# Patient Record
Sex: FEMALE | Race: BLACK OR AFRICAN AMERICAN | NOT HISPANIC OR LATINO | Employment: PART TIME | URBAN - METROPOLITAN AREA
[De-identification: names, ages, dates, MRNs, and addresses within clinical notes are randomized per-mention and may not be internally consistent; named-entity substitution may affect disease eponyms.]

---

## 2023-05-14 ENCOUNTER — HOSPITAL ENCOUNTER (EMERGENCY)
Facility: CLINIC | Age: 23
Discharge: HOME | End: 2023-05-14
Attending: EMERGENCY MEDICINE | Admitting: EMERGENCY MEDICINE
Payer: COMMERCIAL

## 2023-05-14 VITALS
BODY MASS INDEX: 40.18 KG/M2 | SYSTOLIC BLOOD PRESSURE: 137 MMHG | WEIGHT: 250 LBS | HEART RATE: 81 BPM | RESPIRATION RATE: 18 BRPM | OXYGEN SATURATION: 100 % | DIASTOLIC BLOOD PRESSURE: 86 MMHG | TEMPERATURE: 98 F | HEIGHT: 66 IN

## 2023-05-14 DIAGNOSIS — F32.A DEPRESSION, UNSPECIFIED DEPRESSION TYPE: ICD-10-CM

## 2023-05-14 PROCEDURE — 99285 EMERGENCY DEPT VISIT HI MDM: CPT | Mod: 25

## 2023-05-14 PROCEDURE — 90791 PSYCH DIAGNOSTIC EVALUATION: CPT

## 2023-05-14 RX ORDER — HYDROXYZINE HYDROCHLORIDE 25 MG/1
25 TABLET, FILM COATED ORAL 3 TIMES DAILY PRN
Qty: 9 TABLET | Refills: 0 | Status: SHIPPED | OUTPATIENT
Start: 2023-05-14

## 2023-05-14 RX ORDER — HYDROXYZINE HYDROCHLORIDE 25 MG/1
25 TABLET, FILM COATED ORAL 3 TIMES DAILY PRN
Qty: 9 TABLET | Refills: 0 | Status: SHIPPED | OUTPATIENT
Start: 2023-05-14 | End: 2023-05-14

## 2023-05-14 ASSESSMENT — COLUMBIA-SUICIDE SEVERITY RATING SCALE - C-SSRS
TOTAL  NUMBER OF ACTUAL ATTEMPTS LIFETIME: 1
LETHALITY/MEDICAL DAMAGE CODE MOST LETHAL ACTUAL ATTEMPT: NO PHYSICAL DAMAGE OR VERY MINOR PHYSICAL DAMAGE
MOST LETHAL DATE: 66606
1. IN THE PAST MONTH, HAVE YOU WISHED YOU WERE DEAD OR WISHED YOU COULD GO TO SLEEP AND NOT WAKE UP?: YES
LETHALITY/MEDICAL DAMAGE CODE MOST LETHAL POTENTIAL ATTEMPT: BEHAVIOR NOT LIKELY TO RESULT IN INJURY
LETHALITY/MEDICAL DAMAGE CODE MOST RECENT POTENTIAL ATTEMPT: BEHAVIOR NOT LIKELY TO RESULT IN INJURY
REASONS FOR IDEATION LIFETIME: MOSTLY TO END OR STOP THE PAIN (YOU COULDN'T GO ON LIVING WITH THE PAIN OR HOW YOU WERE FEELING)
TOTAL  NUMBER OF ACTUAL ATTEMPTS PAST 3 MONTHS: 1
LETHALITY/MEDICAL DAMAGE CODE FIRST POTENTIAL ATTEMPT: BEHAVIOR NOT LIKELY TO RESULT IN INJURY
2. HAVE YOU ACTUALLY HAD ANY THOUGHTS OF KILLING YOURSELF?: YES
MOST RECENT DATE: 66606
LETHALITY/MEDICAL DAMAGE CODE MOST RECENT ACTUAL ATTEMPT: NO PHYSICAL DAMAGE OR VERY MINOR PHYSICAL DAMAGE
TOTAL  NUMBER OF INTERRUPTED ATTEMPTS LIFETIME: NO
LETHALITY/MEDICAL DAMAGE CODE FIRST ACTUAL ATTEMPT: NO PHYSICAL DAMAGE OR VERY MINOR PHYSICAL DAMAGE
FIRST ATTEMPT DATE: 66606
1. HAVE YOU WISHED YOU WERE DEAD OR WISHED YOU COULD GO TO SLEEP AND NOT WAKE UP?: YES
4. HAVE YOU HAD THESE THOUGHTS AND HAD SOME INTENTION OF ACTING ON THEM?: YES
5. HAVE YOU STARTED TO WORK OUT OR WORKED OUT THE DETAILS OF HOW TO KILL YOURSELF? DO YOU INTEND TO CARRY OUT THIS PLAN?: YES
ATTEMPT PAST THREE MONTHS: YES
REASONS FOR IDEATION PAST MONTH: MOSTLY TO END OR STOP THE PAIN (YOU COULDN'T GO ON LIVING WITH THE PAIN OR HOW YOU WERE FEELING)
2. HAVE YOU ACTUALLY HAD ANY THOUGHTS OF KILLING YOURSELF?: YES
TOTAL  NUMBER OF ABORTED OR SELF INTERRUPTED ATTEMPTS LIFETIME: NO
ATTEMPT LIFETIME: YES
5. HAVE YOU STARTED TO WORK OUT OR WORKED OUT THE DETAILS OF HOW TO KILL YOURSELF? DO YOU INTEND TO CARRY OUT THIS PLAN?: YES
3. HAVE YOU BEEN THINKING ABOUT HOW YOU MIGHT KILL YOURSELF?: YES
6. HAVE YOU EVER DONE ANYTHING, STARTED TO DO ANYTHING, OR PREPARED TO DO ANYTHING TO END YOUR LIFE?: NO
4. HAVE YOU HAD THESE THOUGHTS AND HAD SOME INTENTION OF ACTING ON THEM?: YES

## 2023-05-14 ASSESSMENT — ACTIVITIES OF DAILY LIVING (ADL): ADLS_ACUITY_SCORE: 35

## 2023-05-14 NOTE — ED TRIAGE NOTES
"Pt presents to the ED with c/o worsening depression and suicidal ideation. Pt had a plan to hang herself on Friday with a cable but it \"took too long\". Pt has been feeling more sad and down for the past couple months and has been isolating herself and not attending class. Pt here with mother.       "

## 2023-05-14 NOTE — ED PROVIDER NOTES
EMERGENCY DEPARTMENT ENCOUNTER      NAME: Abril Jang  AGE: 23 year old female  YOB: 2000  MRN: [id number]  EVALUATION DATE & TIME: 5/14/2023 11:36 AM    PCP: No primary care provider on file.    ED PROVIDER: Nya Castro MD      Chief Complaint   Patient presents with     Suicidal         FINAL IMPRESSION:  1. Depression, unspecified depression type          ED COURSE & MEDICAL DECISION MAKING:    Pertinent Labs & Imaging studies reviewed. (See chart for details)  23 year old female presents to the Emergency Department for evaluation of depression and suicidal ideation.  Patient reports that she has been depressed since she was in fifth grade, she has never discussed this with her family.  Since January, she has been feeling more withdrawn, she stopped going to her college courses.  She was placed on academic suspension, her family was not aware of this.  They showed up for her graduation this weekend, she did not graduate as she has not finished her courses.  Patient has never been seen for depression.  She has never been on antidepressants.  Today, she states that she does not feel she would act on any suicidal ideation.  DEC evaluated the patient and was able to have the patient contract for safety.  She is now home with her mom.  DEC was able to schedule follow-up appointments for the patient, patient was given hydroxyzine for anxiety in the interim.  The patient and the patient's mother comfortable with the patient discharging home with the mom.    12:59 PM FAWAD Glover. Appointment on Tuesday. Anxious. Recommend some hydroxyzine.   1:14 PM Rechecked and updated the patient. We discussed the plan for discharge and the patient is agreeable. Reviewed supportive cares, symptomatic treatment, outpatient follow up, and reasons to return to the Emergency Department. Patient to be discharged by ED RN.     At the conclusion of the encounter I discussed the results of all of the tests and the  disposition. The questions were answered. The patient or family acknowledged understanding and was agreeable with the care plan.       Medical Decision Making    History:    Supplemental history from: Documented in chart, if applicable    External Record(s) reviewed: Other: Nurse Triage Phone Call through  5/14    Work Up:    Chart documentation includes differential considered and any EKGs or imaging independently interpreted by provider, where specified.    In additional to work up documented, I considered the following work up: Documented in chart, if applicable.    External consultation:    Discussion of management with another provider: Documented in chart, if applicable    Complicating factors:    Care impacted by chronic illness: Other: depression    Care affected by social determinants of health: Access to Medical Care: no primary care provider    Disposition considerations: Discharge. I prescribed additional prescription strength medication(s) as charted. See documentation for any additional details.      MEDICATIONS GIVEN IN THE EMERGENCY:  Medications - No data to display    NEW PRESCRIPTIONS STARTED AT TODAY'S ER VISIT  New Prescriptions    HYDROXYZINE (ATARAX) 25 MG TABLET    Take 1 tablet (25 mg) by mouth 3 times daily as needed for itching          =================================================================    HPI    Patient information was obtained from: Patient    Use of : N/A         Abril Jang is a 23 year old female with who presents to this ED for evaluation of suicidal ideation and depression.  The patient feels that she has been depressed since she was in fifth grade, she has never seen a provider for this.  She has never been on antidepressants.  Her symptoms worsened in January.  Since then she has been skipping classes and not going to school.  Her family was unaware of this.  She is currently on academic suspension.  She was supposed to graduate this year but is not  "going to graduate.  She was at college until she came back home on Saturday.  She is now living with her mom.  She states that on Friday she put a cable around her neck and attempted to kill herself, but it was \"taking too long\", she therefore aborted the plan.  She states that she does not believe today that she would hurt herself.  She is looking for help with her depression and suicidal ideation.  She states that she uses marijuana regularly and has for the last 3 years.  She drinks alcohol very occasionally, otherwise no other recreational drug use.  She denies taking any other medications.      PAST MEDICAL HISTORY:  History reviewed. No pertinent past medical history.    PAST SURGICAL HISTORY:  History reviewed. No pertinent surgical history.        CURRENT MEDICATIONS:    hydrOXYzine (ATARAX) 25 MG tablet        ALLERGIES:  No Known Allergies    FAMILY HISTORY:  History reviewed. No pertinent family history.    SOCIAL HISTORY:   Social History     Socioeconomic History     Marital status: Single       VITALS:  BP (!) 159/100   Pulse 89   Temp 98  F (36.7  C) (Temporal)   Resp 18   Ht 1.676 m (5' 6\")   Wt 113.4 kg (250 lb)   SpO2 99%   BMI 40.35 kg/m      PHYSICAL EXAM    Constitutional: Well developed, Well nourished, NAD  HENT: Normocephalic, Atraumatic, Bilateral external ears normal, Oropharynx normal, mucous membranes moist, Nose normal.   Neck- Normal range of motion, No tenderness, Supple, No stridor.  Eyes: PERRL, EOMI, Conjunctiva normal, No discharge.   Respiratory: Normal breath sounds, No respiratory distress  Cardiovascular: Normal heart rate, Regular rhythm  Musculoskeletal: No edema. Good range of motion in all major joints. No tenderness to palpation or major deformities noted.   Integument: Warm, Dry, No erythema, No rash  Neurologic: Alert & oriented x 3, Normal motor function, Normal sensory function, No focal deficits noted. Normal gait.   Psychiatric: Flat affect, Judgment normal, " Mood normal.      LAB:  All pertinent labs reviewed and interpreted.       RADIOLOGY:  Reviewed all pertinent imaging. Please see official radiology report.  No orders to display       I, Bianka Cisneros, am serving as a scribe to document services personally performed by Dr. Gloria MD, based on my observations and the provider's statements to me. I, Dr. Gloria MD attest that Bianka Cisneros is acting in a scribe capacity, has observed my performance of the services and has documented them in accordance with my direction.    Nya Castro MD  Emergency Medicine  [facility] EMERGENCY ROOM  [address] [id number] [phone]       Nya Castro MD  05/14/23 [id number]

## 2023-05-14 NOTE — DISCHARGE INSTRUCTIONS
"      Aftercare Plan  If I am feeling unsafe or I am in a crisis, I will:   Contact my established care providers   Call the National Suicide Prevention Lifeline: 988  Go to the nearest emergency room   Call 911   [facility] Crisis Line Number: [phone]      Today you were seen by a licensed mental health professional through Triage and Transition services, [facility] ([facility])  for a crisis assessment in the Emergency Department at [facility].  It is recommended that you follow up with your established providers (psychiatrist, mental health therapist, and/or primary care doctor - as relevant) as soon as possible.     Coordinators from [facility] will be calling you in the next 24-48 hours to ensure that you have the resources you need.  You can also contact [facility] coordinators directly at [phone]. You may have been scheduled for or offered an appointment with a mental health provider. [facility] maintains an extensive network of licensed behavioral health providers to connect patients with the services they need.  We do not charge providers a fee to participate in our referral network.  We match patients with providers based on a patient's specific needs, insurance coverage, and location.  Our first effort will be to refer you to a provider within your care system, and will utilize providers outside your care system as needed.        Warning signs that I or other people might notice when a crisis is developing for me: Thinking about harming self;  Having difficulty sleeping, sleeping too much, unable to meet daily tasks    Things I am able to do on my own to cope or help me feel better: Check out \"brief, free mindfulness meditations on YouTube; check out web for \"ways to manage anxiety\" or \"ways to manage depression.\"  Get outside.  Call someone I trust     Things that I am able to do with others to cope or help me better: Do the things I enjoy with people I enjoy being with; work with a good " "therapist on ways to manage anxiety and depression; get a medication manager for medications      Changes I can make to support my mental health and wellness: start talking with trusted others about thoughts and feelings; let others know what is not helping what is helping; Contact [facility] above for a therapist and a medication manager; talk with [facility] staff about struggles with emotional and physical health at my appointment on Tuesday;  Practice good self care and supportive self talke      Your [facility] has a mental health crisis team you can call 24/7: [facility] Crisis Line Number: [phone]        Crisis Lines  Crisis Text Line  Text 717038  You will be connected with a trained live crisis counselor to provide support.    Por espanol, texto  EVIN a 798819 o texto a 442-AYUDAME en WhatsApp    [employer] (LGBTQ Youth Crisis Line)  1.406.085.6860  text START to 962-521      Community iRule  Fast Tracker  Linking people to mental health and substance use disorder resources  [employer]     [place] Mental [facility] Warm Line  Peer to peer support  Monday thru Saturday, 12 pm to 10 pm  223.076.8659 or 6.239.524.9987  Text \"Support\" to 20281    National [place] [employer] Mental Illness (DANIEL)  734.362.4122 or 1.888.DANIEL.HELPS      Mental Health Apps  My3  https://my3app.org/    VirtualHopeBox  [email or web address]/virtual-hope-box/                           "

## 2023-05-14 NOTE — CONSULTS
"Diagnostic Evaluation Consultation  Crisis Assessment    Patient was assessed: Katherine  Patient location: [facility] ED  Was a release of information signed: No. Reason: no current providers      Referral Data and Chief Complaint  Abril Jang is a 23 year old, who uses she/her pronouns, and presents to the ED with family/friends. Patient is referred to the ED by self. Patient is presenting to the ED for the following concerns: recent suicide attempt, MDD.      Informed Consent and Assessment Methods     Patient is her own guardian. Writer met with patient and explained the crisis assessment process, including applicable information disclosures and limits to confidentiality, assessed understanding of the process, and obtained consent to proceed with the assessment. Patient was observed to be able to participate in the assessment as evidenced by voluntarily engaged in assessment . Assessment methods included conducting a formal interview with patient, review of medical records, collaboration with medical staff, and obtaining relevant collateral information from family and community providers when available..     Over the course of this crisis assessment provided reassurance, offered validation and assisted in processing patient's thoughts and feeling relating to telling someone about MDD and SI . Patient's response to interventions was cooperative, engaged.  Patient was somewhat irritated that writer did not have entire story of what happened on Friday.  Patient explained as noted below.      Summary of Patient Situation    Patient called [facility]riage [facility] and told them she tried to hang self on Friday but that it \"took too long.\"  ED asked patient to come to ED immediately for an assessment.  Patient has a clinic appointment at [facility] on Tuesday.  Patient arrives to ED with mother.     On Friday after trying to hang herself, Pt waited about 20 minutes then took cable down and started doing dishes.  Patient states this " was first time she tried to end her life but states she has had depression since the 5th grade and has thought about suicide many times.  This weekend, mother and other family were expecting to go to patient's college graduation.  Mother says patient has been telling mother that school is going well and was on track for graduation.   Patient has not been attending classes and is on academic probation.   After the attempt to end her life on Friday, patient told a friend Saturday.  She then told her mother about the attempt as well as the extent of her depression.  Patient says she has not been sleeping well.  She has not been able to get out of bed.  She has not been attending classes.      Pt states she is not currently actively suicidal.  She says she can and will call a crisis line.  She will be staying with her mother.          Brief Psychosocial History    Patient was in college until this weekend when family discovered patient has not been attending classes and is not graduating.  Patient was studying economics and business administration.  She states she is not sure what she was going to do with the degree.  Patient is now living with her mother in [facility].  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       Patient has older brother 27with whom she is closest of all her family. Patient's father lives in [place].  They do not have a close relationship but do talk by phone.   Patient does not have a lot of current interests or hobbies.  She says the depression has been keeping her from doing the things she enjoys.    Patient says both parents likely have depression and anxiety.      Significant Clinical History    Patient states Friday was first time she tried to end life.  Patient states she has had MDD since 5th grade.   She has never seen a therapist or been on medication.  Patient says she can call a crisis line but it is difficult to  talk with family.  Patient denies overt trauma.  She has never been hospitalized.  Mother worries patient has BED.  Mother says patient uses food to comfort herself.       Collateral Information    The following information was received from mother Beth Diallo ,  Information was obtained via phone.they last had contact with patient today in ED.     What happened today:  Family had come to patient's graduation to find that patient is not graduating.  Patient told mother of attempt to hang self on Friday.       What is different about patient's functioning: mother says patient gave no indication that she had depression to extent patient now reports.  About 1 year ago patient told mother patient was depressed and was going to talk with someone.  Mother said patient was obviously untruthful about having gotten help then    Concern about alcohol/drug use: No    What do you think the patient needs: Some one to talk with, medications.  Check in to possible eating dx.     Has patient made comments about wanting to kill themselves/others:   Patient made comment about depression 1 year ago and told mother of attempt made Friday.    If d/c is recommended, can they take part in safety/aftercare planning: Yes Pt will be living with mother       Risk Assessment    [place] Suicide Severity Rating Scale Full Clinical Version: 05/14/2023  Suicidal Ideation  1. Wish to be Dead (Lifetime): Yes  1. Wish to be Dead (Past 1 Month): Yes  2. Non-Specific Active Suicidal Thoughts (Lifetime): Yes  2. Non-Specific Active Suicidal Thoughts (Past 1 Month): Yes  3. Active Suicidal Ideation with any Methods (Not Plan) Without Intent to Act (Lifetime): Yes  3. Active Suicidal Ideation with any Methods (Not Plan) Without Intent to Act (Past 1 Month): Yes  4. Active Suicidal Ideation with Some Intent to Act, Without Specific Plan (Lifetime): Yes  4. Active Suicidal Ideation with Some Intent to Act, Without Specific Plan (Past 1 Month):  Yes  5. Active Suicidal Ideation with Specific Plan and Intent (Lifetime): Yes  5. Active Suicidal Ideation with Specific Plan and Intent (Past 1 Month): Yes  Intensity of Ideation  Most Severe Ideation Rating (Lifetime): 5  Most Severe Ideation Rating (Past 1 Month): 5  Frequency (Lifetime): Once a week  Frequency (Past 1 Month): 2-5 times in week  Duration (Lifetime): Less than 1 hour/some of the time  Duration (Past 1 Month): 1-4 hours/a lot of time  Controllability (Lifetime): Can control thoughts with a lot of difficulty  Controllability (Past 1 Month): Unable to control thoughts  Deterrents (Lifetime): Deterrents probably stopped you  Deterrents (Past 1 Month): Deterrents most likely did not stop you  Reasons for Ideation (Lifetime): Mostly to end or stop the pain (You couldn't go on living with the pain or how you were feeling)  Reasons for Ideation (Past 1 Month): Mostly to end or stop the pain (You couldn't go on living with the pain or how you were feeling)  Suicidal Behavior  Actual Attempt (Lifetime): Yes  Total Number of Actual Attempts (Lifetime): 1  Actual Attempt (Past 3 Months): Yes  Total Number of Actual Attempts (Past 3 Months): 1  Has subject engaged in non-suicidal self-injurious behavior? (Lifetime): No  Interrupted Attempts (Lifetime): No  Aborted or Self-Interrupted Attempt (Lifetime): No  Preparatory Acts or Behavior (Lifetime): No  C-SSRS Risk (Lifetime/Recent)  Calculated C-SSRS Risk Score (Lifetime/Recent): High Risk    [place] Suicide Severity Rating Scale Since Last Contact:      Actual/Potential Lethality (Most Lethal Attempt)  Most Lethal Attempt Date: 05/12/23  Actual Lethality/Medical Damage Code (Most Lethal Attempt): No physical damage or very minor physical damage  Potential Lethality Code (Most Lethal Attempt): Behavior not likely to result in injury       Validity of evaluation is not impacted by presenting factors during interview .   Comments regarding subjective versus  objective responses to [place] tool:   Environmental or Psychosocial Events: work or task failure, challenging interpersonal relationships, geographic isolation from supports, history of TBI, neither working nor attending school and social isolation  Chronic Risk Factors: history of suicide attempts (1), chronic and ongoing sleep difficulties and parental mental health issue   Warning Signs: seeking access to means to hurt or kill self, talking or writing about death, dying, or suicide, hopelessness, feeling trapped, like there is no way out, withdrawing from friends, family, and society, anxiety, agitation, unable to sleep, sleeping all the time, dramatic changes in mood, no reason for living, no sense of purpose in life and engaging in self-destructive behavior  Protective Factors: reality testing ability  Interpretation of Risk Scoring, Risk Mitigation Interventions and Safety Plan:       Does the patient have thoughts of harming others? No     Is the patient engaging in sexually inappropriate behavior?  no        Current Substance Abuse     Is there recent substance abuse? Patient uses marijuana regularly but states it is not problematic     Was a urine drug screen or blood alcohol level obtained: No       Mental Status Exam     Affect: Appropriate   Appearance: Appropriate    Attention Span/Concentration: Attentive  Eye Contact: Engaged   Fund of Knowledge: Appropriate    Language /Speech Content: Fluent   Language /Speech Volume: Normal    Language /Speech Rate/Productions: Normal    Recent Memory: Intact   Remote Memory: Intact   Mood: Anxious and Normal    Orientation to Person: Yes    Orientation to Place: Yes   Orientation to Time of Day: Yes    Orientation to Date: Yes    Situation (Do they understand why they are here?): Yes    Psychomotor Behavior: Normal    Thought Content: Clear   Thought Form: Intact      History of commitment: No       Medication    Psychotropic medications: Hydroxyzine given in  small sample in ED  Medication changes made in the last two weeks: See above otherwise, no       Current Care Team    Primary Care Provider: HANSEL MARIN [place]  Psychiatrist: No  Therapist: No  : No     CTSS or ARMHS: No  ACT Team: No  Other: No      Diagnosis    296.30 (F33.9) Major Depressive Disorder, Recurrent Episode, Unspecified _ and With anxious distress   300.02 (F41.1) Generalized Anxiety Disorder  - active, untreated with occassional panic attacks per pt   Rule out Eating disorder BED F50.81    Clinical Summary and Substantiation of Recommendations    Patient scores at high risk for suicide on [place] scale due to recent attempt to end her life by hanging.  Patient however denies to ED staff, triage nurse line staff and this writer that she is currently actively suicidal.   Patient also seems eager to connect with therapeutic resources.  Patient did agree to safety plan.   Patient therefore will be discharged to outpatient resources.   Patient will be living with her mother who is supportive of treatment and is aware of patient's depression and recent suicide attempt.        Disposition    Recommended disposition: Individual Therapy and Medication Management       Reviewed case and recommendations with attending provider. Attending Name: Nya Castro       Attending concurs with disposition: Yes       Patient and/or validated legal guardian concurs with disposition: Yes       Final disposition: Individual therapy  and Medication management.       Outpatient Details (if applicable):   Aftercare plan and appointments placed in the AVS and provided to patient: Yes. Given to patient by ED staff    Was lethal means counseling provided as a part of aftercare planning? Yes - describe patient denies intent;       Assessment Details    Patient interview started at: 12:28 pm and completed at:12:53 pm     Total duration spent on the patient case in minutes: 1.0 hrs      CPT code(s)  "utilized: 63395 - Psychotherapy for Crisis - 60 (30-74*) min       Belen Joya, [facility], MSW, [facility]SW, Psychotherapist  DEC - Triage & Transition Services  Callback: [phone]      Aftercare Plan  If I am feeling unsafe or I am in a crisis, I will:   Contact my established care providers   Call the National Suicide Prevention Lifeline: 988  Go to the nearest emergency room   Call 911   [facility] Crisis Line Number: [phone]      Today you were seen by a licensed mental health professional through Triage and Transition services, [facility] ([facility])  for a crisis assessment in the Emergency Department at [facility].  It is recommended that you follow up with your established providers (psychiatrist, mental health therapist, and/or primary care doctor - as relevant) as soon as possible.     Coordinators from [facility] will be calling you in the next 24-48 hours to ensure that you have the resources you need.  You can also contact [facility] coordinators directly at [phone]. You may have been scheduled for or offered an appointment with a mental health provider. [facility] maintains an extensive network of licensed behavioral health providers to connect patients with the services they need.  We do not charge providers a fee to participate in our referral network.  We match patients with providers based on a patient's specific needs, insurance coverage, and location.  Our first effort will be to refer you to a provider within your care system, and will utilize providers outside your care system as needed.        Warning signs that I or other people might notice when a crisis is developing for me: Thinking about harming self;  Having difficulty sleeping, sleeping too much, unable to meet daily tasks    Things I am able to do on my own to cope or help me feel better: Check out \"brief, free mindfulness meditations on YouTube; check out web for \"ways to manage anxiety\" or \"ways to manage depression.\" " " Get outside.  Call someone I trust     Things that I am able to do with others to cope or help me better: Do the things I enjoy with people I enjoy being with; work with a good therapist on ways to manage anxiety and depression; get a medication manager for medications      Changes I can make to support my mental health and wellness: start talking with trusted others about thoughts and feelings; let others know what is not helping what is helping; Contact [facility] above for a therapist and a medication manager; talk with [facility] staff about struggles with emotional and physical health at my appointment on Tuesday;  Practice good self care and supportive self talke      Your [facility] has a mental health crisis team you can call 24/7: [facility] Crisis Line Number: [phone]        Crisis Lines  Crisis Text Line  Text 266340  You will be connected with a trained live crisis counselor to provide support.    Por sourav, texto  EVIN a 786235 o texto a 442-AYUDAME en WhatsApp    [employer] (LGBTQ Youth Crisis Line)  3.724.022.7465  text START to 220-379      [employer]  Fast Tracker  Linking people to mental health and substance use disorder resources  fastLove [employer] Sw[employer]ckL[employer] Viewn.org     [place] [facility] Warm Line  Peer to peer support  Monday thru Saturday, 12 pm to 10 pm  684.279.7595 or 7.813.011.1770  Text \"Support\" to 81328    National [place] on Mental Illness (DANIEL)  618.593.0976 or 1.888.DANIEL.HELPS      Mental Health Apps  My3  https://my3app.org/    VirtualHopeBox  [email or web address]/virtual-hope-box/                        "